# Patient Record
Sex: FEMALE | Race: BLACK OR AFRICAN AMERICAN | Employment: UNEMPLOYED
[De-identification: names, ages, dates, MRNs, and addresses within clinical notes are randomized per-mention and may not be internally consistent; named-entity substitution may affect disease eponyms.]

---
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
## 2025-05-21 ENCOUNTER — HOSPITAL ENCOUNTER (INPATIENT)
Facility: HOSPITAL | Age: 20
LOS: 6 days | Discharge: HOME | DRG: 885 | End: 2025-05-27
Attending: PSYCHIATRY & NEUROLOGY | Admitting: PSYCHIATRY & NEUROLOGY
Payer: COMMERCIAL

## 2025-05-21 DIAGNOSIS — F23 ACUTE PSYCHOSIS (HCC): Primary | ICD-10-CM

## 2025-05-21 PROBLEM — F29 PSYCHOSIS, UNSPECIFIED PSYCHOSIS TYPE (HCC): Status: ACTIVE | Noted: 2025-05-21

## 2025-05-21 LAB
ALBUMIN SERPL-MCNC: 3.7 G/DL (ref 3.4–5)
ALBUMIN/GLOB SERPL: 0.9 (ref 0.8–1.7)
ALP SERPL-CCNC: 75 U/L (ref 45–117)
ALT SERPL-CCNC: 9 U/L (ref 10–35)
AMPHET UR QL SCN: NEGATIVE
ANION GAP SERPL CALC-SCNC: 12 MMOL/L (ref 3–18)
AST SERPL-CCNC: 25 U/L (ref 10–38)
BARBITURATES UR QL SCN: NEGATIVE
BASOPHILS # BLD: 0.04 K/UL (ref 0–0.1)
BASOPHILS NFR BLD: 0.7 % (ref 0–2)
BENZODIAZ UR QL: NEGATIVE
BILIRUB SERPL-MCNC: 1.2 MG/DL (ref 0.2–1)
BUN SERPL-MCNC: 6 MG/DL (ref 6–23)
BUN/CREAT SERPL: 8 (ref 12–20)
CALCIUM SERPL-MCNC: 9.3 MG/DL (ref 8.5–10.1)
CANNABINOIDS UR QL SCN: NEGATIVE
CHLORIDE SERPL-SCNC: 100 MMOL/L (ref 98–107)
CO2 SERPL-SCNC: 24 MMOL/L (ref 21–32)
COCAINE UR QL SCN: NEGATIVE
CREAT SERPL-MCNC: 0.76 MG/DL (ref 0.6–1.3)
DIFFERENTIAL METHOD BLD: ABNORMAL
EOSINOPHIL # BLD: 0.1 K/UL (ref 0–0.4)
EOSINOPHIL NFR BLD: 1.6 % (ref 0–5)
ERYTHROCYTE [DISTWIDTH] IN BLOOD BY AUTOMATED COUNT: 12.8 % (ref 11.6–14.5)
ETHANOL SERPL-MCNC: <11 MG/DL (ref 0–0.08)
FENTANYL: NEGATIVE
GLOBULIN SER CALC-MCNC: 4.4 G/DL (ref 2–4)
GLUCOSE SERPL-MCNC: 80 MG/DL (ref 74–108)
HCG SERPL QL: NEGATIVE
HCT VFR BLD AUTO: 36.8 % (ref 35–45)
HGB BLD-MCNC: 11.7 G/DL (ref 12–16)
IMM GRANULOCYTES # BLD AUTO: 0.01 K/UL (ref 0–0.04)
IMM GRANULOCYTES NFR BLD AUTO: 0.2 % (ref 0–0.5)
LYMPHOCYTES # BLD: 1.26 K/UL (ref 0.9–3.6)
LYMPHOCYTES NFR BLD: 20.6 % (ref 21–52)
Lab: NORMAL
MCH RBC QN AUTO: 26.5 PG (ref 24–34)
MCHC RBC AUTO-ENTMCNC: 31.8 G/DL (ref 31–37)
MCV RBC AUTO: 83.3 FL (ref 78–100)
METHADONE UR QL: NEGATIVE
MONOCYTES # BLD: 0.43 K/UL (ref 0.05–1.2)
MONOCYTES NFR BLD: 7 % (ref 3–10)
NEUTS SEG # BLD: 4.27 K/UL (ref 1.8–8)
NEUTS SEG NFR BLD: 69.9 % (ref 40–73)
NRBC # BLD: 0 K/UL (ref 0–0.01)
NRBC BLD-RTO: 0 PER 100 WBC
OPIATES UR QL: NEGATIVE
OXYCODONE UR QL SCN: NEGATIVE
PLATELET # BLD AUTO: 295 K/UL (ref 135–420)
PMV BLD AUTO: 10.3 FL (ref 9.2–11.8)
POTASSIUM SERPL-SCNC: 3.4 MMOL/L (ref 3.5–5.5)
PROT SERPL-MCNC: 8.1 G/DL (ref 6.4–8.2)
RBC # BLD AUTO: 4.42 M/UL (ref 4.2–5.3)
SODIUM SERPL-SCNC: 136 MMOL/L (ref 136–145)
WBC # BLD AUTO: 6.1 K/UL (ref 4.6–13.2)

## 2025-05-21 PROCEDURE — 6370000000 HC RX 637 (ALT 250 FOR IP): Performed by: EMERGENCY MEDICINE

## 2025-05-21 PROCEDURE — 84703 CHORIONIC GONADOTROPIN ASSAY: CPT

## 2025-05-21 PROCEDURE — 99285 EMERGENCY DEPT VISIT HI MDM: CPT

## 2025-05-21 PROCEDURE — 93005 ELECTROCARDIOGRAM TRACING: CPT | Performed by: EMERGENCY MEDICINE

## 2025-05-21 PROCEDURE — 80307 DRUG TEST PRSMV CHEM ANLYZR: CPT

## 2025-05-21 PROCEDURE — 80053 COMPREHEN METABOLIC PANEL: CPT

## 2025-05-21 PROCEDURE — 82077 ASSAY SPEC XCP UR&BREATH IA: CPT

## 2025-05-21 PROCEDURE — 94761 N-INVAS EAR/PLS OXIMETRY MLT: CPT

## 2025-05-21 PROCEDURE — 90791 PSYCH DIAGNOSTIC EVALUATION: CPT | Performed by: SOCIAL WORKER

## 2025-05-21 PROCEDURE — 1240000000 HC EMOTIONAL WELLNESS R&B

## 2025-05-21 PROCEDURE — 85025 COMPLETE CBC W/AUTO DIFF WBC: CPT

## 2025-05-21 RX ORDER — BENZTROPINE MESYLATE 1 MG/1
1 TABLET ORAL EVERY 6 HOURS PRN
Status: DISCONTINUED | OUTPATIENT
Start: 2025-05-21 | End: 2025-05-27 | Stop reason: HOSPADM

## 2025-05-21 RX ORDER — HYDROXYZINE HYDROCHLORIDE 25 MG/1
25 TABLET, FILM COATED ORAL EVERY 6 HOURS PRN
Status: DISCONTINUED | OUTPATIENT
Start: 2025-05-21 | End: 2025-05-27 | Stop reason: HOSPADM

## 2025-05-21 RX ORDER — HALOPERIDOL 5 MG/ML
5 INJECTION INTRAMUSCULAR EVERY 6 HOURS PRN
Status: DISCONTINUED | OUTPATIENT
Start: 2025-05-21 | End: 2025-05-27 | Stop reason: HOSPADM

## 2025-05-21 RX ORDER — MAGNESIUM HYDROXIDE/ALUMINUM HYDROXICE/SIMETHICONE 120; 1200; 1200 MG/30ML; MG/30ML; MG/30ML
30 SUSPENSION ORAL EVERY 6 HOURS PRN
Status: DISCONTINUED | OUTPATIENT
Start: 2025-05-21 | End: 2025-05-27 | Stop reason: HOSPADM

## 2025-05-21 RX ORDER — TRAZODONE HYDROCHLORIDE 50 MG/1
50 TABLET ORAL NIGHTLY PRN
Status: DISCONTINUED | OUTPATIENT
Start: 2025-05-21 | End: 2025-05-27 | Stop reason: HOSPADM

## 2025-05-21 RX ORDER — HALOPERIDOL 5 MG/1
5 TABLET ORAL EVERY 6 HOURS PRN
Status: DISCONTINUED | OUTPATIENT
Start: 2025-05-21 | End: 2025-05-27 | Stop reason: HOSPADM

## 2025-05-21 RX ORDER — ACETAMINOPHEN 325 MG/1
650 TABLET ORAL EVERY 4 HOURS PRN
Status: DISCONTINUED | OUTPATIENT
Start: 2025-05-21 | End: 2025-05-27 | Stop reason: HOSPADM

## 2025-05-21 RX ORDER — POTASSIUM CHLORIDE 1500 MG/1
40 TABLET, EXTENDED RELEASE ORAL
Status: COMPLETED | OUTPATIENT
Start: 2025-05-21 | End: 2025-05-21

## 2025-05-21 RX ORDER — BENZTROPINE MESYLATE 1 MG/ML
1 INJECTION, SOLUTION INTRAMUSCULAR; INTRAVENOUS EVERY 6 HOURS PRN
Status: DISCONTINUED | OUTPATIENT
Start: 2025-05-21 | End: 2025-05-27 | Stop reason: HOSPADM

## 2025-05-21 RX ADMIN — POTASSIUM CHLORIDE 40 MEQ: 1500 TABLET, EXTENDED RELEASE ORAL at 09:17

## 2025-05-21 ASSESSMENT — SLEEP AND FATIGUE QUESTIONNAIRES
DO YOU HAVE DIFFICULTY SLEEPING: YES
DO YOU USE A SLEEP AID: NO
SLEEP PATTERN: DIFFICULTY FALLING ASLEEP;RESTLESSNESS;NIGHTMARES/TERRORS
AVERAGE NUMBER OF SLEEP HOURS: 3

## 2025-05-21 ASSESSMENT — PAIN SCALES - GENERAL
PAINLEVEL_OUTOF10: 0
PAINLEVEL_OUTOF10: 0

## 2025-05-21 NOTE — ED NOTES
Patient standing at door saying she has to leave.  She states that she has to pick her kid up from school.  Pt informed that her kid is safe at home with his grandma.  Pt provided food and drink.

## 2025-05-21 NOTE — ED NOTES
Pt urinated on bed.  Provided new scrubs and linen.  Pt ambulates with no difficulty, and stated she just didn't want to get up to the bathroom to urinate.    General

## 2025-05-21 NOTE — ED NOTES
TRANSFER - OUT REPORT:    Verbal report given to MONIQUE Dixon on Agnieszka Mcgill  being transferred to [facility] for routine progression of patient care       Report consisted of patient's Situation, Background, Assessment and   Recommendations(SBAR).     Information from the following report(s) Nurse Handoff Report, Index, ED Encounter Summary, ED SBAR, Adult Overview, Intake/Output, MAR, Recent Results, Quality Measures, Neuro Assessment, and Event Log was reviewed with the receiving nurse.    [place] Fall Assessment:    Presents to emergency department  because of falls (Syncope, seizure, or loss of consciousness): No  Age > 70: No  Altered Mental Status, Intoxication with alcohol or substance confusion (Disorientation, impaired judgment, poor safety awaremess, or inability to follow instructions): No  Impaired Mobility: Ambulates or transfers with assistive devices or assistance; Unable to ambulate or transer.: No  Nursing Judgement: No          Lines:       Opportunity for questions and clarification was provided.      Patient transported with:  Tech

## 2025-05-21 NOTE — VIRTUAL HEALTH
Agnieszka Mcgill  [id number]  2005     Social Work Behavioral Health Crisis Assessment    05/21/25    Chief Complaint: Psych Evaluation    HPI: Patient is a 20 y.o. Black /  female who presents for psych evaluation. Patient presented to the ED on 05/21/25 from home.    ED recorded, Patient and mother are more in altercation this morning. Each of them called police. Was advised to come to the emergency department for mental health assessment and perhaps initiation of medications.     Pt reported, \"I was at my house, and I had been seeing things for a week. I saw my mom this morning and we didn't see eye to eye. I have been seeing cartoon, black figures, and stuff on the wall. I do see them now, to the right behind me, and the corners. They don't speak. Its been about a week or month. I am on a year cleanse - drinking every night and smoking, but its been a year. In the last 24 hours, I was thinking about killing people, for no reason. I would do it but why, why would I.\"    Collateral:      Lucy Mcgill (Parent)  [phone] (Mobile)   No answer, straight to voicemail    Past Psychiatric History:  Previous Diagnoses/symptoms: Denies  Previous suicide attempts/self-harm: \"I was self-harming, using an knife, and just hurting myself. I would do it on my arms but its been a while, like 9 or 10\".  Inpatient psychiatric hospitalizations: no  Current outpatient psychiatric provider: [facility] - about three months   Current therapist: States not in therapy  Previous psychiatric medication trials: No prior medication trials  Current psychiatric medications: No current psychiatric medications  Family Psychiatric History: Denies    Sleep Hours: 4-6    Sleep concerns: difficulty attaining sleep    Use of sleep medications: denies    Substance Abuse History:  Tobacco: Denies  Alcohol: Denies  Marijuana: Denies  Stimulant: Denies  Opiates: Denies  Benzodiazepine: Denies  Other illicit drug

## 2025-05-21 NOTE — ED NOTES
Patients father, Marley Mcgill, called and given update on progression of care.  He requests to be called when it is known if pt will be discharged or admitted. Phone number is [phone].

## 2025-05-21 NOTE — ED NOTES
Patients mother arrived to ED for update on daughter.  RN talked with her in consultation room.  Patients mother showed this RN videos of patient in psychotic episodes, where she is \"not herself.\"  Her mother expresses concern for the safety of her grandson (patients son) who is 3 years old.  One video while patient was \"not being herself\"  patients son was dragged down the stairs, and he was crying asking not to be in the care of his mother.  [facility] notified of this new information given.

## 2025-05-21 NOTE — ED NOTES
Transfer Center Handoff for Behavioral Health Transfers      Patient's Current Location: [facility] EMERGENCY DEPARTMENT     Chief Complaint   Patient presents with    Mental Health Problem       Current or History of Violent Behavior: No    Currently in Restraints Now or During this Encounter: No  (Specify if Agitation or self harm is noted in ED?)  If yes, please describe behaviors requiring restraint:             Medical Clearance Documented and Verified in the Chart: Yes    Allergies   Allergen Reactions    Penicillins Rash        Can Patient Tolerate Lying Flat: Yes    Able to Perform ADLs:  Yes  (Specify if able to ambulate or uses any mobility devices such as cane or walker)  Activity:    Level of Assistance:    Assistive Device:    Miscellaneous Devices:      LABS    CBC:   Lab Results   Component Value Date/Time    WBC 6.1 05/21/2025 08:27 AM    RBC 4.42 05/21/2025 08:27 AM    HGB 11.7 05/21/2025 08:27 AM    HCT 36.8 05/21/2025 08:27 AM    MCV 83.3 05/21/2025 08:27 AM    MCH 26.5 05/21/2025 08:27 AM    MCHC 31.8 05/21/2025 08:27 AM    RDW 12.8 05/21/2025 08:27 AM     05/21/2025 08:27 AM    MPV 10.3 05/21/2025 08:27 AM     CMP:   Lab Results   Component Value Date/Time     05/21/2025 08:27 AM    K 3.4 05/21/2025 08:27 AM     05/21/2025 08:27 AM    CO2 24 05/21/2025 08:27 AM    BUN 6 05/21/2025 08:27 AM    CREATININE 0.76 05/21/2025 08:27 AM    LABGLOM >90 05/21/2025 08:27 AM    LABGLOM >60 08/19/2023 08:05 PM    GLUCOSE 80 05/21/2025 08:27 AM    CALCIUM 9.3 05/21/2025 08:27 AM    BILITOT 1.2 05/21/2025 08:27 AM    ALKPHOS 75 05/21/2025 08:27 AM    ALKPHOS 128 11/22/2021 08:20 AM    AST 25 05/21/2025 08:27 AM    ALT 9 05/21/2025 08:27 AM     Drug Panel:   Lab Results   Component Value Date/Time    AMPHETAMUR Negative 05/21/2025 08:19 AM    BARBITURATUR Negative 05/21/2025 08:19 AM    COCAINEUR Negative 05/21/2025 08:19 AM    METHADU Negative 05/21/2025 08:19 AM    OPIAU

## 2025-05-21 NOTE — CONSULTS
[facility]  FAMILY MEDICINE CONSULT NOTE FOR BEHAVIORAL HEALTH UNIT    Patient:    Agnieszka Mcgill , 20 y.o. female   Room/Bed:  105/01  Admission Date:   5/21/2025  Code status:  Full Code      Reason for Consult: medical management   Psychiatry Attending Requesting Consult: Dr. Kang     ASSESSMENT:  Agnieszka Mcgill is a 20 y.o. female w/ a PMH of anemia,  presenting for auditory hallucinations who is now admitted to the [facility] Behavioral Health Unit.    Nurse Chaperone during History and Physical: no    PLAN:    Problem 1: Auditory hallucinations   -Management per inpatient psychiatry    Anemia   - previously been on iron supplement but states she stopped taking   - has had mild anemia in past   - asymptomatic   - does get menstrual periods, unsure when last was (urine preg negative this admission)   - encouraged patient to follow up outpatient when feeling better. Patient agreed, however focused on leaving hospital at this time and continues asking about it during interview      Sore throat   - states she came to hospital due to sore throat, 1month, denies fevers or other symptoms. Exam of throat unremarkable, no lymphadenopathy   - discussed reassuring exam with patient and discussed tylenol and lozenges available as needed       Global  Diet: per unit protocol  Mobility: per unit protocol     Thank you for this consult.         SUBJECTIVE:   Dr. Kang has consulted Family Medicine to evaluate Agnieszka Mcgill  in the Emergency Department. She  is a 20 y.o. female w/ PMHx of anemia ,  presenting for auditory hallucinations who is now admitted to the [facility] Behavioral Health Unit.     ED Course:  -Vitals: VSS  -Labs: Potassium mildly low at 3.4 (replete in ED), Urine pregnancy negative, negative ethanol level, negative UDS, CBC showing mild normocytic anemia to 11.7 (patient has had this in past, baseline Hb 11-12)    -Imaging: --  -EKG: normal sinus rhythm, T wave inversion in V1, otherwise no

## 2025-05-21 NOTE — PLAN OF CARE
Problem: Risk for Elopement  Goal: Patient will not exit the unit/facility without proper excort  Outcome: Not Progressing     Problem: Risk for Elopement  Goal: Patient will not exit the unit/facility without proper excort  Outcome: Not Progressing

## 2025-05-21 NOTE — BSMART NOTE
Crisis note:    Called the [facility] answering service to have the on call clinician paged to request a TDO evaluation on this patient.    1140: Melodie of [facility] responded to page and has been informed of the TDO evaluation request on this patient.

## 2025-05-21 NOTE — ED TRIAGE NOTES
Pt in ED c/o needing to speak to a mental health person. Pt states she got into an argument with her mom this AM. And her mom called the police. Pt was brought into ER by police. Pt has hx of Bipolar, anxiety . Pt denies SI/HI but does express some AH and VH

## 2025-05-21 NOTE — FLOWSHEET NOTE
05/21/25 0806   Vital Signs   Temp 99.1 °F (37.3 °C)   Pulse 96   Respirations 18   /72   MAP (Calculated) 86   Oxygen Therapy   SpO2 97 %   Height and Weight   Height 1.499 m (4' 11\")   Weight - Scale 74.8 kg (165 lb)   BSA (Calculated - sq m) 1.76 sq meters   BMI (Calculated) 33.4

## 2025-05-21 NOTE — ED PROVIDER NOTES
homophones, and other interpretive errors are inadvertently transcribed by the computer software.  Please disregard these errors.  Please excuse any errors that have escaped final proofreading.      CORETTA Becerril Jana L, PA  05/21/25 3164

## 2025-05-21 NOTE — ED NOTES
Pt seen by Mary from [facility] who reports she is supporting TDO at this time.

## 2025-05-21 NOTE — BSMART NOTE
Crisis note:    Melodie of [facility] has seen patient and is supporting a TDO, will be presenting case to the .      1350: discussed patient with Dr. Tinsley and patient has been accepted on the TDO.  Orders received  Bed assignment pending.  Adult 2  Room assignment pending discharge  Report to ext. [phone]  Unit MONIQUE Dixon informed  Orders received.  Room will be 105-01      Dr. Tinsley spoke to [facility] in regards to patient being in outpatient services with them. Informed that patient was a no show for her follow up appointment in March.

## 2025-05-22 PROBLEM — F22 DELUSIONAL DISORDER (HCC): Status: ACTIVE | Noted: 2025-05-21

## 2025-05-22 LAB
EKG ATRIAL RATE: 80 BPM
EKG DIAGNOSIS: NORMAL
EKG P AXIS: 63 DEGREES
EKG P-R INTERVAL: 158 MS
EKG Q-T INTERVAL: 386 MS
EKG QRS DURATION: 82 MS
EKG QTC CALCULATION (BAZETT): 445 MS
EKG R AXIS: 84 DEGREES
EKG T AXIS: 42 DEGREES
EKG VENTRICULAR RATE: 80 BPM

## 2025-05-22 PROCEDURE — 93010 ELECTROCARDIOGRAM REPORT: CPT | Performed by: INTERNAL MEDICINE

## 2025-05-22 PROCEDURE — 1240000000 HC EMOTIONAL WELLNESS R&B

## 2025-05-22 PROCEDURE — 6370000000 HC RX 637 (ALT 250 FOR IP): Performed by: PSYCHIATRY & NEUROLOGY

## 2025-05-22 RX ADMIN — TRAZODONE HYDROCHLORIDE 50 MG: 50 TABLET ORAL at 23:10

## 2025-05-22 RX ADMIN — HYDROXYZINE HYDROCHLORIDE 25 MG: 25 TABLET, FILM COATED ORAL at 23:10

## 2025-05-22 ASSESSMENT — PAIN SCALES - GENERAL: PAINLEVEL_OUTOF10: 0

## 2025-05-22 NOTE — PLAN OF CARE
Problem: Psychosis  Goal: Will report no hallucinations or delusions  Description: INTERVENTIONS:1. Administer medication as  ordered2. Assist with reality testing to support increasing orientation3. Assess if patient's hallucinations or delusions are encouraging self harm or harm to others and intervene as appropriate  Outcome: Not Progressing     Problem: Anxiety  Goal: Will report anxiety at manageable levels  Description: INTERVENTIONS:1. Administer medication as ordered2. Teach and rehearse alternative coping skills3. Provide emotional support with 1:1 interaction with staff  Outcome: Not Progressing

## 2025-05-22 NOTE — BH NOTE
Pt sitting in day room quiet. Pt refused breakfast this morning. Will continue to monitor for safety.

## 2025-05-22 NOTE — H&P
Behavioral Health AdmissionNote    Admit Date: 5/21/2025  Hospital day 1    Vitals : No data found.  Labs:  No results found for this or any previous visit (from the past 24 hours).  Meds:   Current Facility-Administered Medications   Medication Dose Route Frequency    benztropine (COGENTIN) tablet 1 mg  1 mg Oral Q6H PRN    acetaminophen (TYLENOL) tablet 650 mg  650 mg Oral Q4H PRN    aluminum & magnesium hydroxide-simethicone (MAALOX PLUS) 200-200-20 MG/5ML suspension 30 mL  30 mL Oral Q6H PRN    hydrOXYzine HCl (ATARAX) tablet 25 mg  25 mg Oral Q6H PRN    haloperidol (HALDOL) tablet 5 mg  5 mg Oral Q6H PRN    Or    haloperidol lactate (HALDOL) injection 5 mg  5 mg IntraMUSCular Q6H PRN    benztropine mesylate (COGENTIN) injection 1 mg  1 mg IntraMUSCular Q6H PRN    traZODone (DESYREL) tablet 50 mg  50 mg Oral Nightly PRN    benzocaine-menthol (CEPACOL SORE THROAT) lozenge 1 lozenge  1 lozenge Oral Q2H PRN      Hospital Problems: Principal Problem:    Delusional disorder (HCC)  Resolved Problems:    * No resolved hospital problems. *      Subjective:   Mental Status Exam  Sensorium: alert  Orientation: oriented to time, place, person and situation  Relations: guarded  Eye Contact: poor  Appearance: is tense  Thought Process: normal rate of thoughts, poor abstract reasoning/computation, and illogical    Thought Content: obsessions  and centered on beliefher mother has been replaced by a different woman and does not have to deal with her   Suicidal: denies   Homicidal: denies   Mood: is irritable and unhappy   Affect: stable  Memory: shows no evidence of impairment     Concentration: distractable  Abstraction: concrete  Insight: The patient shows no insight    OR Poor  Judgement: is psychologically impaired OR  Poor    Assessment/Plan:   not changed    Identifying data: The patient is a 20-year-old single -American female who says that she lives in a house with mother and her 3-year-old son and is not

## 2025-05-22 NOTE — PROGRESS NOTES
conducted an initial consultation and Spiritual Assessment for Agnieszka Mcgill, who is a 20 y.o.,female. Patient's Primary Language is: English.   According to the patient's EMR Cheondoism Affiliation is: Quaker.     The reason the Patient came to the hospital is:   Patient Active Problem List    Diagnosis Date Noted    Psychosis, unspecified psychosis type (HCC) 05/21/2025    Encounter for induction of labor 11/23/2021    Pregnancy 11/22/2021        The  provided the following Interventions:  Initiated a relationship of care and support.   Explored issues of milton, belief, spirituality and Taoist/ritual needs while hospitalized.  Listened empathically.  Provided chaplaincy education.  Provided information about Spiritual Care Services.  Offered prayer and assurance of continued prayers on patient's behalf.   Chart reviewed.    The following outcomes where achieved:  Patient shared limited information about both their medical narrative and spiritual journey/beliefs.   confirmed Patient's Cheondoism Affiliation.  Patient processed feeling about current hospitalization.  Patient expressed gratitude for 's visit.    Assessment:  Patient does not have any Taoist/cultural needs that will affect patient's preferences in health care.  There are no spiritual or Taoist issues which require intervention at this time.     Plan:  Chaplains will continue to follow and will provide pastoral care on an as needed/requested basis.   recommends bedside caregivers page  on duty if patient shows signs of acute spiritual or emotional distress.    wan Salguero  Spiritual Care   [phone]     Spiritual Health History and Assessment/Progress Note  [facility]    Loneliness/Social Isolation,  ,  , Initial Encounter    Name: Agnieszka Mcgill MRN: [id number]    Age: 20 y.o.     Sex: female   Language: English   Denominational: Quaker   Psychosis, unspecified

## 2025-05-22 NOTE — GROUP NOTE
Group Therapy Note    Date: 5/22/2025    Group Start Time: 1410  Group End Time: 1430  Group Topic: Recreational    [facility]    Cj Srinivasan        Group Therapy Note    Attendees: 3/8      Group: Self-Esteem Thumb ball     Focus: Patients used thumball to learn what healthy self-esteem is and how to increase. Group members were able to answer questions, listen to other participants, and learn how to increase self-esteem and self-worth. This group may help enhance self-awareness, self-confidence, mood, and decrease stress and anxiety.               Notes: Patient shared feeling \"good\" during check-in. Patient resistant to sharing in group discussions and expressed she didn't want to participate in group game.     Status After Intervention:  Unchanged    Participation Level: Minimal and None    Participation Quality: Resistant      Speech:  hesitant      Thought Process/Content: Logical      Affective Functioning: Congruent      Mood: Calm      Level of consciousness:  Inattentive      Response to Learning: Resistant      Endings: None Reported    Modes of Intervention: Socialization and Activity        Certified Therapeutic Recreation Specialist    Cj Srinivasan

## 2025-05-22 NOTE — GROUP NOTE
Group Therapy Note    Date: 5/22/2025    Group Start Time: 0930  Group End Time: 1015  Group Topic: Recreational    [facility]    Cj Srinivasan        Group Therapy Note    Attendees: 5/15    Group type: Exercise    Group Focus: This recreational group engaged patients in physical activity.  Recreational therapists led group members in guided exercises. Patients discussed different physical activities and relaxation techniques they may use or can add to their daily routines. This group may help reduce feelings of depression and stress and enhance mood and over emotional well-being.        Notes: Patient did not attend group.   Certified Therapeutic Recreation Specialist    Cj Srinivasan

## 2025-05-22 NOTE — PROGRESS NOTES
Patient has denied having suicidal ideations.She has been observed walking around in the community room.She has verbalized that she wants to go home but has not been demanding.She has approached the nurse's station several times asking when the docter will arrive.She has stated\"I slept good.. .I am not talking about problems right now.I spoke to somebody is why I am here . I had to urinate.\"She has not eaten meals.She has verbalized feeling anxious.She has been encouraged to share feelings and learn relaxation techniques.She did remove her necklace and hair clip this morning and items were given to security.She did attempt to remove her nose ring but was unable to do so as she tried in front of this writer and security.She was able to verbally  contract for safety and reasons for precautions and safety protocol explained to patient.

## 2025-05-22 NOTE — BH NOTE
Group Therapy Note  Agnieszka Mcgill     Date: 5/22/2025     Group Start Time: 10:00 AM  Group End Time:11:00 am     Group Topic: Coping and Adjusting to Transition          Attendees: 6     Group:  Storytelling Treatment Group  Focus:   Patient listened to a children's story about a grasshopper that transitions from one home to another home, meets new friends and then has to leave to go to another home. Participants processed emotions associated with living in one place and having to transition to another home. Participants discussed emotions about leaving friends, making new friends and then having to leave those friends to leave again. Participants described the importance of having support systems wherever you go in life. The participants described the kind of individuals that they want to be apart of their life journey's. The facilitator emphasized the importance of having positive support systems. The story does not have a definitive ending. The participants were encouraged to dream big and encouraged to think positively about their futures.             Note: Patient did not participate in the group.       Chel Ceja LCSW

## 2025-05-22 NOTE — BH NOTE
The patient was being observed in the dayroom for the majority of the shift. She kept changing chairs and attempting to move them around. The patient engaged in arts and crafts and was present for snack time at 2000, during which she ate snacks. While watching TV, the patient remained cooperative and interacted appropriately with peers. The patient sat quietly in the dayroom for a period and then alternated between her room and the dayroom, eventually staying in the dayroom for the remainder of the shift. During this time, the patient was observed laughing to herself on multiple occasions. She appeared to be anxious and later on became agitated. The patient was observed to have approximately 0 hours of sleep.

## 2025-05-22 NOTE — PLAN OF CARE
Problem: Risk for Elopement  Goal: Patient will not exit the unit/facility without proper excort  5/21/2025 1817 by Destiny Orlando, RN  Outcome: Not Progressing     Problem: Behavior  Goal: Pt/Family maintain appropriate behavior and adhere to behavioral management agreement, if implemented  Description: INTERVENTIONS:1. Assess patient/family's coping skills and  non-compliant behavior (including use of illegal substances)2. Notify security of behavior or suspected illegal substances which indicate the need for search of the family and/or belongings3. Encourage verbalization of thoughts and concerns in a socially appropriate manner4. Utilize positive, consistent limit setting strategies supporting safety of patient, staff and others5. Encourage participation in the decision making process about the behavioral management agreement6. If a visitor's behavior poses a threat to safety call refer to organization policy.7. Initiate consult with , Psychosocial CNS, Spiritual Care as appropriate  Flowsheets (Taken 5/21/2025 2216)  Patient/family maintains appropriate behavior and adheres to behavioral management agreement, if implemented:   Assess patient/family’s coping skills and  non-compliant behavior (including use of illegal substances)   Encourage verbalization of thoughts and concerns in a socially appropriate manner   Utilize positive, consistent limit setting strategies supporting safety of patient, staff and others     Problem: Risk for Elopement  Goal: Patient will not exit the unit/facility without proper excort  5/21/2025 1817 by Destiny Orlando, RN  Outcome: Not Progressing   Pt. is visible in the milieu. She is anxious and expressed desire to go home. Pt. is loose and paranoid. She refused to go to her room .She stated that she will stay in the dayroom for the night. She is pleasant and cooperative. She is free from falls, self harm or harming others.Will continue to monitor for safety, high risk

## 2025-05-22 NOTE — PROGRESS NOTES
Pt. remain awake in the dayroom. She still refused to got to her room.She is getting irritable and argumentative. Pt. needs redirection and reorientation. Pt. refused to take  PRN medication  for anxiety or agitation. Will continue to monitor for safety and provide  Support as needed.

## 2025-05-22 NOTE — PROGRESS NOTES
Patient refused to complete the OQ Assessment. Patient refused to share any information about where she resides, who she is seen by in the community or any information to assist with discharge planning.

## 2025-05-23 PROCEDURE — 1240000000 HC EMOTIONAL WELLNESS R&B

## 2025-05-23 PROCEDURE — 6370000000 HC RX 637 (ALT 250 FOR IP): Performed by: PSYCHIATRY & NEUROLOGY

## 2025-05-23 RX ORDER — HALOPERIDOL 5 MG/1
5 TABLET ORAL 2 TIMES DAILY
Status: DISCONTINUED | OUTPATIENT
Start: 2025-05-23 | End: 2025-05-25

## 2025-05-23 RX ADMIN — TRAZODONE HYDROCHLORIDE 50 MG: 50 TABLET ORAL at 20:58

## 2025-05-23 RX ADMIN — HALOPERIDOL 5 MG: 5 TABLET ORAL at 20:58

## 2025-05-23 ASSESSMENT — PAIN SCALES - GENERAL: PAINLEVEL_OUTOF10: 0

## 2025-05-23 NOTE — PROGRESS NOTES
Behavioral Health Progress Note    Admit Date: 5/21/2025  Hospital day 2    Vitals : No data found.  Labs:  No results found for this or any previous visit (from the past 24 hours).  Meds:   Current Facility-Administered Medications   Medication Dose Route Frequency    haloperidol (HALDOL) tablet 5 mg  5 mg Oral BID    benztropine (COGENTIN) tablet 1 mg  1 mg Oral Q6H PRN    acetaminophen (TYLENOL) tablet 650 mg  650 mg Oral Q4H PRN    aluminum & magnesium hydroxide-simethicone (MAALOX PLUS) 200-200-20 MG/5ML suspension 30 mL  30 mL Oral Q6H PRN    hydrOXYzine HCl (ATARAX) tablet 25 mg  25 mg Oral Q6H PRN    haloperidol (HALDOL) tablet 5 mg  5 mg Oral Q6H PRN    Or    haloperidol lactate (HALDOL) injection 5 mg  5 mg IntraMUSCular Q6H PRN    benztropine mesylate (COGENTIN) injection 1 mg  1 mg IntraMUSCular Q6H PRN    traZODone (DESYREL) tablet 50 mg  50 mg Oral Nightly PRN    benzocaine-menthol (CEPACOL SORE THROAT) lozenge 1 lozenge  1 lozenge Oral Q2H PRN      Hospital Problems: Principal Problem:    Delusional disorder (HCC)  Resolved Problems:    * No resolved hospital problems. *      Subjective:   Mental Status Exam  Sensorium: alert  Orientation: only aware of place and person  Relations: guarded, uncooperative, and unreliable  Eye Contact: poor  Appearance: is unkempt, is bizarre, and is tense  Thought Process: fast rate of thoughts, poor abstract reasoning/computation, and illogical    Thought Content: delusions   Suicidal: denies   Homicidal: denies   Mood: is angry and is anxious   Affect: labile  Memory: is impaired, is recent, and is remote     Concentration: distractable  Abstraction: concrete  Insight: The patient shows no insight    OR Poor  Judgement: is psychologically impaired OR  Poor    Assessment/Plan:   not changed       Identifying data: The patient is a 20-year-old single -American female who says that she lives in a house with mother and her 3-year-old son and is not insured.

## 2025-05-23 NOTE — GROUP NOTE
Group Therapy Note    Date: 5/23/2025    Group Start Time: 1440  Group End Time: 1515  Group Topic: Recreational    [facility]    Cj Srinivasan        Group Therapy Note    Attendees: 5/8    Group Type: Coping Skills Elizabet   Group Focus: Recreational therapy group engaged patients through a game that focused on coping skills. PTs discussed the importance of coping mechanisms and what coping strategies they use. This group may help enhance your social skills, coping techniques, and decrease stress, depression, and anxiety.            Notes: Patient minimally engaged in group discussion. Patient shared she enjoys music as a coping skill. Patient at one point stop responding during discussions, ignoring therapist questions.      Status After Intervention:  Unchanged    Participation Level: Minimal    Participation Quality: Sharing      Speech:  hesitant      Thought Process/Content: Logical      Affective Functioning: Flat      Mood: Calm      Level of consciousness:  Alert      Response to Learning: Able to verbalize current knowledge/experience      Endings: None Reported    Modes of Intervention: Education, Socialization, and Activity        Certified Therapeutic Recreation Specialist    Cj Srinivasan

## 2025-05-23 NOTE — BH NOTE
Note: The above pt on 5-23-25 was In Voluntary Committed cardex updated and charge nurse informed. She did not experience any falls while entering and exiting off the unit with staff along with hospital security.     Armand MATTHEW  5-23-25

## 2025-05-23 NOTE — PROGRESS NOTES
Patient seen squatting in dayroom. Behavioral [facility] called asked this nurse to come to dayroom. Once I arrived to patient, she stood up and had a bowel movement on the floor. Patient cleaned up the stool and stated she had to go to the bathroom. This nurse escorted patient to her room so that she could finish using the bathroom. Patient repeated several times \"I just want to go home\". Advised her the dayroom is not the proper place to use the bathroom. Patient was apologetic and verbalized understanding.

## 2025-05-23 NOTE — BH NOTE
The patient was being  observed in the dayroom engaging in arts and crafts and watching television. She was present during snack time and interacted appropriately with peers. Throughout the evening, the patient moved intermittently between her room and the dayroom, spending extended periods in the dayroom. She demonstrated a fixation with rearranging chairs, frequently moving back and forth between different seats and repositioning them. At approximately 2145, the patient expressed a desire to go home and subsequently engaged in disorganized behavior. She sat down in the dayroom, removed her pants, and had a bowel movement. She then used a pair of blue pants to clean the area and returned to her room to change clothes. At around 0000, the patient was observed repeatedly flushing the toilet multiple times in succession. During this time, the patient displayed behavioral dysregulation and was not cooperative. However, she was intermittently quiet and withdrawn, occasionally interacting with peers,typically only when approached first. The patient was being observed sleeping for approximately 5 hours.

## 2025-05-23 NOTE — GROUP NOTE
Group Therapy Note    Date: 5/23/2025    Group Start Time: 0940  Group End Time: 1045  Group Topic: Recreational    [facility]    Cj Srinivasan        Group Therapy Note    Attendees: 6/18      Group Type: Anxiety Thumball    Group Focus: Patients used thumball to learn coping skills for anxiety, while increasing social skills. Group members were able to answer questions, listen and communicate with peers on handling anxious feelings. This group may help enhance social skills, coping skills, mood, and decrease stress and anxiety.          Notes: Patient did not participating in group, patient sitting in group area completing a coloring sheet.    Certified Therapeutic Recreation Specialist    Cj Srinivasan

## 2025-05-23 NOTE — PROGRESS NOTES
The patient is insisting that the doctor not only told her, but wrote in his note today that she can be discharged  today.  She is asking to be discharged.  The patient was made aware that the doctor did not discharge her today.

## 2025-05-23 NOTE — PLAN OF CARE
Problem: Psychosis  Goal: Will report no hallucinations or delusions  Description: INTERVENTIONS:1. Administer medication as  ordered2. Assist with reality testing to support increasing orientation3. Assess if patient's hallucinations or delusions are encouraging self harm or harm to others and intervene as appropriate  5/22/2025 1212 by Garima Fulton, RN  Outcome: Not Progressing     Problem: Behavior  Goal: Pt/Family maintain appropriate behavior and adhere to behavioral management agreement, if implemented  Description: INTERVENTIONS:1. Assess patient/family's coping skills and  non-compliant behavior (including use of illegal substances)2. Notify security of behavior or suspected illegal substances which indicate the need for search of the family and/or belongings3. Encourage verbalization of thoughts and concerns in a socially appropriate manner4. Utilize positive, consistent limit setting strategies supporting safety of patient, staff and others5. Encourage participation in the decision making process about the behavioral management agreement6. If a visitor's behavior poses a threat to safety call refer to organization policy.7. Initiate consult with , Psychosocial CNS, Spiritual Care as appropriate  Outcome: Not Progressing     Problem: Anxiety  Goal: Will report anxiety at manageable levels  Description: INTERVENTIONS:1. Administer medication as ordered2. Teach and rehearse alternative coping skills3. Provide emotional support with 1:1 interaction with staff  5/22/2025 7823 by Mary Rea, RN  Outcome: Not Progressing  5/22/2025 1212 by Garima Fulton, RN  Outcome: Not Progressing      Problem: Self Harm/Suicidality  Goal: Will have no self-injury during hospital stay  Description: INTERVENTIONS:1.  Ensure constant observer at bedside with Q15M safety checks2.  Maintain a safe environment3.  Secure patient belongings4.  Ensure family/visitors adhere to safety

## 2025-05-23 NOTE — PLAN OF CARE
Problem: Psychosis  Goal: Will report no hallucinations or delusions  Description: INTERVENTIONS:1. Administer medication as  ordered2. Assist with reality testing to support increasing orientation3. Assess if patient's hallucinations or delusions are encouraging self harm or harm to others and intervene as appropriate  Outcome: Not Progressing     Problem: Behavior  Goal: Pt/Family maintain appropriate behavior and adhere to behavioral management agreement, if implemented  Description: INTERVENTIONS:1. Assess patient/family's coping skills and  non-compliant behavior (including use of illegal substances)2. Notify security of behavior or suspected illegal substances which indicate the need for search of the family and/or belongings3. Encourage verbalization of thoughts and concerns in a socially appropriate manner4. Utilize positive, consistent limit setting strategies supporting safety of patient, staff and others5. Encourage participation in the decision making process about the behavioral management agreement6. If a visitor's behavior poses a threat to safety call refer to organization policy.7. Initiate consult with , Psychosocial CNS, Spiritual Care as appropriate  5/23/2025 1130 by Angela Hutson, RN  Outcome: Progressing    Problem: Self Harm/Suicidality  Goal: Will have no self-injury during hospital stay  Description: INTERVENTIONS:1.  Ensure constant observer at bedside with Q15M safety checks2.  Maintain a safe environment3.  Secure patient belongings4.  Ensure family/visitors adhere to safety recommendations5.  Ensure safety tray has been added to patient's diet order6.  Every shift and PRN: Re-assess suicidal risk via Frequent Screener  5/23/2025 1130 by Angela Hutson, RN  Outcome: Progressing      Patient presents suspicious and labile; however, she is cooperative upon approach. Patient is hyper-focused on discharge. No inappropriate or bizarre behaviors noted thus far in this

## 2025-05-24 PROCEDURE — 1240000000 HC EMOTIONAL WELLNESS R&B

## 2025-05-24 PROCEDURE — 6370000000 HC RX 637 (ALT 250 FOR IP): Performed by: PSYCHIATRY & NEUROLOGY

## 2025-05-24 RX ADMIN — HALOPERIDOL 5 MG: 5 TABLET ORAL at 11:07

## 2025-05-24 RX ADMIN — HALOPERIDOL 5 MG: 5 TABLET ORAL at 20:25

## 2025-05-24 NOTE — PLAN OF CARE
Problem: Self Harm/Suicidality  Goal: Will have no self-injury during hospital stay  Description: INTERVENTIONS:1.  Ensure constant observer at bedside with Q15M safety checks2.  Maintain a safe environment3.  Secure patient belongings4.  Ensure family/visitors adhere to safety recommendations5.  Ensure safety tray has been added to patient's diet order6.  Every shift and PRN: Re-assess suicidal risk via Frequent Screener  Outcome: Progressing     Problem: Behavior  Goal: Pt/Family maintain appropriate behavior and adhere to behavioral management agreement, if implemented  Description: INTERVENTIONS:1. Assess patient/family's coping skills and  non-compliant behavior (including use of illegal substances)2. Notify security of behavior or suspected illegal substances which indicate the need for search of the family and/or belongings3. Encourage verbalization of thoughts and concerns in a socially appropriate manner4. Utilize positive, consistent limit setting strategies supporting safety of patient, staff and others5. Encourage participation in the decision making process about the behavioral management agreement6. If a visitor's behavior poses a threat to safety call refer to organization policy.7. Initiate consult with , Psychosocial CNS, Spiritual Care as appropriate  Outcome: Progressing     Problem: Anxiety  Goal: Will report anxiety at manageable levels  Description: INTERVENTIONS:1. Administer medication as ordered2. Teach and rehearse alternative coping skills3. Provide emotional support with 1:1 interaction with staff  Outcome: Progressing   Pt presents with a dull affect and calm mood. Pt has been withdrawn to self on the unit. She displays appropriate boundaries on the unit, adhering to unit guidelines. Pt reports adequate sleep and appetite. She is medication compliant. Pt appears disheveled; however, she is showering daily. Pt denies SI/HI/AVH.

## 2025-05-24 NOTE — PROGRESS NOTES
Behavioral Health Progress Note    Admit Date: 5/21/2025  Hospital day     Vitals : Patient Vitals for the past 8 hrs:   BP Temp Temp src Pulse Resp SpO2   05/24/25 1224 99/66 97.6 °F (36.4 °C) Oral 72 16 100 %     Labs:  No results found for this or any previous visit (from the past 24 hours).  Meds:   Current Facility-Administered Medications   Medication Dose Route Frequency    haloperidol (HALDOL) tablet 5 mg  5 mg Oral BID    benztropine (COGENTIN) tablet 1 mg  1 mg Oral Q6H PRN    acetaminophen (TYLENOL) tablet 650 mg  650 mg Oral Q4H PRN    aluminum & magnesium hydroxide-simethicone (MAALOX PLUS) 200-200-20 MG/5ML suspension 30 mL  30 mL Oral Q6H PRN    hydrOXYzine HCl (ATARAX) tablet 25 mg  25 mg Oral Q6H PRN    haloperidol (HALDOL) tablet 5 mg  5 mg Oral Q6H PRN    Or    haloperidol lactate (HALDOL) injection 5 mg  5 mg IntraMUSCular Q6H PRN    benztropine mesylate (COGENTIN) injection 1 mg  1 mg IntraMUSCular Q6H PRN    traZODone (DESYREL) tablet 50 mg  50 mg Oral Nightly PRN    benzocaine-menthol (CEPACOL SORE THROAT) lozenge 1 lozenge  1 lozenge Oral Q2H PRN      Hospital Problems: Principal Problem:    Delusional disorder (HCC)  Resolved Problems:    * No resolved hospital problems. *      Subjective:   Medication side effects: none      Mental Status Exam  Sensorium: alert  Orientation: only aware of time, place, and person  Relations: guarded, passive, and uncooperative  Eye Contact: poor  Appearance: is unkempt  Thought Process: slow rate of thoughts, poor abstract reasoning/computation, and simple logical    Thought Content: denial of illness, oppositional but now taking meds   Suicidal: denies   Homicidal: denies   Mood: is irritable and unhappy   Affect:  strange, constricted  Memory: is impaired and is recent     Concentration: distractable  Abstraction: concrete  Insight: The patient shows little insight    OR Poor  Judgement: is psychologically impaired OR  Poor    Assessment/Plan:   Mild

## 2025-05-24 NOTE — PLAN OF CARE
Problem: Self Harm/Suicidality  Goal: Will have no self-injury during hospital stay  Description: INTERVENTIONS:1.  Ensure constant observer at bedside with Q15M safety checks2.  Maintain a safe environment3.  Secure patient belongings4.  Ensure family/visitors adhere to safety recommendations5.  Ensure safety tray has been added to patient's diet order6.  Every shift and PRN: Re-assess suicidal risk via Frequent Screener  5/23/2025 2124 by Perla Flores RN  Flowsheets (Taken 5/23/2025 2124)  Will have no self-injury during hospital stay:   Ensure constant observer at bedside with Q15M safety checks   Ensure family/visitors adhere to safety recommendations   Every shift and PRN: Re-assess suicidal risk via Frequent Screener  5/23/2025 1130 by Angela Hutson RN  Outcome: Progressing     Problem: Psychosis  Goal: Will report no hallucinations or delusions  Description: INTERVENTIONS:1. Administer medication as  ordered2. Assist with reality testing to support increasing orientation3. Assess if patient's hallucinations or delusions are encouraging self harm or harm to others and intervene as appropriate  5/23/2025 1130 by Angela Hutson RN  Outcome: Not Progressing     Problem: Psychosis  Goal: Will report no hallucinations or delusions  Description: INTERVENTIONS:1. Administer medication as  ordered2. Assist with reality testing to support increasing orientation3. Assess if patient's hallucinations or delusions are encouraging self harm or harm to others and intervene as appropriate  5/23/2025 1130 by Angela Hutson RN  Outcome: Not Progressing   Pt. Is visible in the milieu. She is quiet, pleasant and compliant. She is free from falls, self harm or harming others.

## 2025-05-24 NOTE — BH NOTE
patient appeared to be in a pleasant mood, was cooperative, and engaged in appropriate social interactions with both staff and peers.  Patient slept for 7 hours

## 2025-05-25 PROCEDURE — 1240000000 HC EMOTIONAL WELLNESS R&B

## 2025-05-25 PROCEDURE — 6370000000 HC RX 637 (ALT 250 FOR IP): Performed by: PSYCHIATRY & NEUROLOGY

## 2025-05-25 RX ORDER — HALOPERIDOL 5 MG/1
5 TABLET ORAL NIGHTLY
Status: DISCONTINUED | OUTPATIENT
Start: 2025-05-25 | End: 2025-05-27 | Stop reason: HOSPADM

## 2025-05-25 RX ADMIN — HALOPERIDOL 5 MG: 5 TABLET ORAL at 09:35

## 2025-05-25 RX ADMIN — HALOPERIDOL 5 MG: 5 TABLET ORAL at 20:15

## 2025-05-25 ASSESSMENT — PAIN SCALES - GENERAL
PAINLEVEL_OUTOF10: 0
PAINLEVEL_OUTOF10: 0

## 2025-05-25 NOTE — PLAN OF CARE
Problem: Self Harm/Suicidality  Goal: Will have no self-injury during hospital stay  Description: INTERVENTIONS:1.  Ensure constant observer at bedside with Q15M safety checks2.  Maintain a safe environment3.  Secure patient belongings4.  Ensure family/visitors adhere to safety recommendations5.  Ensure safety tray has been added to patient's diet order6.  Every shift and PRN: Re-assess suicidal risk via Frequent Screener  5/24/2025 2045 by Cynthia Tatum, RN  Outcome: Progressing     Problem: Behavior  Goal: Pt/Family maintain appropriate behavior and adhere to behavioral management agreement, if implemented  Description: INTERVENTIONS:1. Assess patient/family's coping skills and  non-compliant behavior (including use of illegal substances)2. Notify security of behavior or suspected illegal substances which indicate the need for search of the family and/or belongings3. Encourage verbalization of thoughts and concerns in a socially appropriate manner4. Utilize positive, consistent limit setting strategies supporting safety of patient, staff and others5. Encourage participation in the decision making process about the behavioral management agreement6. If a visitor's behavior poses a threat to safety call refer to organization policy.7. Initiate consult with , Psychosocial CNS, Spiritual Care as appropriate  5/24/2025 2045 by Cynthia Tatum, RN  Outcome: Progressing     Problem: Anxiety  Goal: Will report anxiety at manageable levels  Description: INTERVENTIONS:1. Administer medication as ordered2. Teach and rehearse alternative coping skills3. Provide emotional support with 1:1 interaction with staff  5/24/2025 2045 by Cynthia Tatum, RN  Outcome: Progressing

## 2025-05-25 NOTE — PLAN OF CARE
Problem: Pain  Goal: Verbalizes/displays adequate comfort level or baseline comfort level  Outcome: Progressing     Problem: Risk for Elopement  Goal: Patient will not exit the unit/facility without proper excort  Outcome: Progressing     Problem: Self Harm/Suicidality  Goal: Will have no self-injury during hospital stay  5/25/2025 0932 by Jessie Luther RN  Outcome: Progressing  5/24/2025 2045 by Cynthia Tatum RN  Outcome: Progressing     Problem: Darlene  Goal: Will exhibit normal sleep and speech and no impulsivity  Outcome: Progressing     Problem: Psychosis  Goal: Will report no hallucinations or delusions  Outcome: Progressing     Problem: Behavior  Goal: Pt/Family maintain appropriate behavior and adhere to behavioral management agreement, if implemented  5/25/2025 0932 by Jessie Luther RN  Outcome: Progressing  5/24/2025 2045 by Cynthia Tatum RN  Outcome: Progressing     Problem: Anxiety  Goal: Will report anxiety at manageable levels  5/25/2025 0932 by Jessie Luther RN  Outcome: Progressing  5/24/2025 2045 by Cynthia Tatum RN  Outcome: Progressing     Problem: Involuntary Admit  Goal: Will cooperate with staff recommendations and doctor's orders and will demonstrate appropriate behavior  Outcome: Progressing     The patient has been quiet in bed most of the morning.  Presently she is sleeping.  Her appetite was good for breakfast this morning.   Her behavior has been appropriate.   She contracted for safety and denies thoughts of harming others.   Will continue to provide a safe and supportive environment.   Patient is utilizing non skid socks.  Patient has been fall free.

## 2025-05-25 NOTE — PROGRESS NOTES
Behavioral Health Progress Note    Admit Date: 5/21/2025  Hospital day : 4    Vitals : Patient Vitals for the past 8 hrs:   BP Temp Temp src Pulse Resp SpO2   05/25/25 0849 107/74 98.6 °F (37 °C) Oral 100 18 99 %     Labs:  No results found for this or any previous visit (from the past 24 hours).  Meds:   Current Facility-Administered Medications   Medication Dose Route Frequency    haloperidol (HALDOL) tablet 5 mg  5 mg Oral BID    benztropine (COGENTIN) tablet 1 mg  1 mg Oral Q6H PRN    acetaminophen (TYLENOL) tablet 650 mg  650 mg Oral Q4H PRN    aluminum & magnesium hydroxide-simethicone (MAALOX PLUS) 200-200-20 MG/5ML suspension 30 mL  30 mL Oral Q6H PRN    hydrOXYzine HCl (ATARAX) tablet 25 mg  25 mg Oral Q6H PRN    haloperidol (HALDOL) tablet 5 mg  5 mg Oral Q6H PRN    Or    haloperidol lactate (HALDOL) injection 5 mg  5 mg IntraMUSCular Q6H PRN    benztropine mesylate (COGENTIN) injection 1 mg  1 mg IntraMUSCular Q6H PRN    traZODone (DESYREL) tablet 50 mg  50 mg Oral Nightly PRN    benzocaine-menthol (CEPACOL SORE THROAT) lozenge 1 lozenge  1 lozenge Oral Q2H PRN      Hospital Problems: Principal Problem:    Delusional disorder (HCC)  Resolved Problems:    * No resolved hospital problems. *      Subjective:   Medication side effects: none      Mental Status Exam  Sensorium: alert  Orientation: only aware of time, place, and person  Relations: guarded, passive, and uncooperative  Eye Contact: poor  Appearance: is unkempt  Thought Process: slow rate of thoughts, poor abstract reasoning/computation, and simple logical    Thought Content: denial of illness, oppositional but now taking meds   Suicidal: denies   Homicidal: denies   Mood: is irritable and unhappy   Affect:  strange, constricted  Memory: is impaired and is recent     Concentration: distractable  Abstraction: concrete  Insight: The patient shows little insight    OR Poor  Judgement: is psychologically impaired OR  Poor    Assessment/Plan:   Mild

## 2025-05-25 NOTE — PLAN OF CARE
Problem: Self Harm/Suicidality  Goal: Will have no self-injury during hospital stay  Description: INTERVENTIONS:1.  Ensure constant observer at bedside with Q15M safety checks2.  Maintain a safe environment3.  Secure patient belongings4.  Ensure family/visitors adhere to safety recommendations5.  Ensure safety tray has been added to patient's diet order6.  Every shift and PRN: Re-assess suicidal risk via Frequent Screener  5/25/2025 1958 by Cynthia Tatum, RN  Outcome: Progressing     Problem: Psychosis  Goal: Will report no hallucinations or delusions  Description: INTERVENTIONS:1. Administer medication as  ordered2. Assist with reality testing to support increasing orientation3. Assess if patient's hallucinations or delusions are encouraging self harm or harm to others and intervene as appropriate  5/25/2025 1958 by Cynthia Tatum, RN  Outcome: Progressing     Problem: Behavior  Goal: Pt/Family maintain appropriate behavior and adhere to behavioral management agreement, if implemented  Description: INTERVENTIONS:1. Assess patient/family's coping skills and  non-compliant behavior (including use of illegal substances)2. Notify security of behavior or suspected illegal substances which indicate the need for search of the family and/or belongings3. Encourage verbalization of thoughts and concerns in a socially appropriate manner4. Utilize positive, consistent limit setting strategies supporting safety of patient, staff and others5. Encourage participation in the decision making process about the behavioral management agreement6. If a visitor's behavior poses a threat to safety call refer to organization policy.7. Initiate consult with , Psychosocial CNS, Spiritual Care as appropriate  5/25/2025 1958 by Cynthia Tatum, RN  Outcome: Progressing     Problem: Anxiety  Goal: Will report anxiety at manageable levels  Description: INTERVENTIONS:1. Administer medication as ordered2. Teach and rehearse

## 2025-05-26 PROCEDURE — 1240000000 HC EMOTIONAL WELLNESS R&B

## 2025-05-26 NOTE — GROUP NOTE
Group Therapy Note    Date: 5/26/2025    Group Start Time: 1000  Group End Time: 1045  Group Topic: Recreational    [facility]    Cj Srinivasan        Group Therapy Note    Attendees: 5/8    Group: Wheel of Emotions   Focus: Patients engaged in group that focused on their emotions. Patients created a picture, an abstract piece of art, or a block of colours showing how each emotion is illustrated in their mind. This group may help patients process emotions, identify triggers, and learn coping mechanisms          Notes:  Patient reported feeling \"good\" during check-in. Patient filled her emotion wheel with the emotions happy, excited, and peace. Patient at times displaying bizarre behavior, pt left group several times to return to her room and flush her toilet several times. Patient have at times had random laughing outbursts. Pt preoccupied with completing a variety of things unrelated to group.      Status After Intervention:  Unchanged    Participation Level: Active Listener and Interactive    Participation Quality: Appropriate and Sharing      Speech:  normal      Thought Process/Content: Logical      Affective Functioning: Congruent      Mood: euthymic      Level of consciousness:  Alert, Attentive, and Preoccupied      Response to Learning: Able to verbalize current knowledge/experience      Endings: None Reported    Modes of Intervention: Socialization, Clarifying, and Activity        Certified Therapeutic Recreation Specialist    Cj Srinivasan

## 2025-05-26 NOTE — BH NOTE
KIKE Note: Staff performed rounds with the on-coming shift and gave shift report prior to turning over to the on coming shift.

## 2025-05-26 NOTE — PLAN OF CARE
Problem: Pain  Goal: Verbalizes/displays adequate comfort level or baseline comfort level  Outcome: Progressing     Problem: Risk for Elopement  Goal: Patient will not exit the unit/facility without proper excort  Outcome: Progressing     Problem: Self Harm/Suicidality  Goal: Will have no self-injury during hospital stay  Description: INTERVENTIONS:1.  Ensure constant observer at bedside with Q15M safety checks2.  Maintain a safe environment3.  Secure patient belongings4.  Ensure family/visitors adhere to safety recommendations5.  Ensure safety tray has been added to patient's diet order6.  Every shift and PRN: Re-assess suicidal risk via Frequent Screener  Outcome: Progressing     Problem: Darlene  Goal: Will exhibit normal sleep and speech and no impulsivity  Description: INTERVENTIONS:1. Administer medication as ordered2. Set limits on impulsive behavior3. Make attempts to decrease external stimuli as possible  Outcome: Progressing     Problem: Psychosis  Goal: Will report no hallucinations or delusions  Description: INTERVENTIONS:1. Administer medication as  ordered2. Assist with reality testing to support increasing orientation3. Assess if patient's hallucinations or delusions are encouraging self harm or harm to others and intervene as appropriate  Outcome: Progressing     Problem: Behavior  Goal: Pt/Family maintain appropriate behavior and adhere to behavioral management agreement, if implemented  Description: INTERVENTIONS:1. Assess patient/family's coping skills and  non-compliant behavior (including use of illegal substances)2. Notify security of behavior or suspected illegal substances which indicate the need for search of the family and/or belongings3. Encourage verbalization of thoughts and concerns in a socially appropriate manner4. Utilize positive, consistent limit setting strategies supporting safety of patient, staff and others5. Encourage participation in the decision making process about the

## 2025-05-26 NOTE — BH NOTE
Patient appears to have slept roughly 7 hours during the shift without any major incidents or events. Staff will continue to monitor for behavior and safety, and report any changes.

## 2025-05-26 NOTE — PROGRESS NOTES
Behavioral Health Progress Note    Admit Date: 5/21/2025  Hospital day : 5    Vitals : Patient Vitals for the past 8 hrs:   BP Temp Temp src Pulse Resp SpO2   05/26/25 0823 116/77 99 °F (37.2 °C) Oral 100 18 100 %     Labs:  No results found for this or any previous visit (from the past 24 hours).  Meds:   Current Facility-Administered Medications   Medication Dose Route Frequency    haloperidol (HALDOL) tablet 5 mg  5 mg Oral Nightly    benztropine (COGENTIN) tablet 1 mg  1 mg Oral Q6H PRN    acetaminophen (TYLENOL) tablet 650 mg  650 mg Oral Q4H PRN    aluminum & magnesium hydroxide-simethicone (MAALOX PLUS) 200-200-20 MG/5ML suspension 30 mL  30 mL Oral Q6H PRN    hydrOXYzine HCl (ATARAX) tablet 25 mg  25 mg Oral Q6H PRN    haloperidol (HALDOL) tablet 5 mg  5 mg Oral Q6H PRN    Or    haloperidol lactate (HALDOL) injection 5 mg  5 mg IntraMUSCular Q6H PRN    benztropine mesylate (COGENTIN) injection 1 mg  1 mg IntraMUSCular Q6H PRN    traZODone (DESYREL) tablet 50 mg  50 mg Oral Nightly PRN    benzocaine-menthol (CEPACOL SORE THROAT) lozenge 1 lozenge  1 lozenge Oral Q2H PRN      Hospital Problems: Principal Problem:    Delusional disorder (HCC)  Resolved Problems:    * No resolved hospital problems. *      Subjective:   Medication side effects: none      Mental Status Exam  Sensorium: alert  Orientation: only aware of time, place, and person  Relations: guarded, passive, and uncooperative  Eye Contact: poor  Appearance: is unkempt  Thought Process: slow rate of thoughts, poor abstract reasoning/computation, and simple logical    Thought Content: denial of illness, Suicidal: denies   Homicidal: denies   Mood: is neutral  Affect:  strange, constricted  Memory: is impaired and is recent     Concentration: distractable  Abstraction: concrete  Insight: The patient shows little insight    OR Poor  Judgement: is psychologically impaired OR  Poor    Assessment/Plan:   Mild improvement    Identifying data: The patient is

## 2025-05-27 VITALS
BODY MASS INDEX: 33.26 KG/M2 | WEIGHT: 165 LBS | OXYGEN SATURATION: 99 % | TEMPERATURE: 98.3 F | RESPIRATION RATE: 18 BRPM | HEART RATE: 92 BPM | DIASTOLIC BLOOD PRESSURE: 56 MMHG | SYSTOLIC BLOOD PRESSURE: 96 MMHG | HEIGHT: 59 IN

## 2025-05-27 RX ORDER — HALOPERIDOL 5 MG/1
5 TABLET ORAL NIGHTLY
Qty: 14 TABLET | Refills: 0 | Status: SHIPPED | OUTPATIENT
Start: 2025-05-27

## 2025-05-27 ASSESSMENT — PAIN SCALES - GENERAL: PAINLEVEL_OUTOF10: 0

## 2025-05-27 NOTE — PROGRESS NOTES
Patient was visible in the community room this morning.She interacted with selected peers.She was anxious to leave.She did say her goodbyes at 1330 prior to leaving and was  escorted off the unit by staff.She voiced no concerns.
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
## 2025-05-27 NOTE — PLAN OF CARE
Problem: Self Harm/Suicidality  Goal: Will have no self-injury during hospital stay  Description: INTERVENTIONS:1.  Ensure constant observer at bedside with Q15M safety checks2.  Maintain a safe environment3.  Secure patient belongings4.  Ensure family/visitors adhere to safety recommendations5.  Ensure safety tray has been added to patient's diet order6.  Every shift and PRN: Re-assess suicidal risk via Frequent Screener  5/27/2025 0325 by Deshaun Gaspar RN  Outcome: Progressing  5/26/2025 1740 by Destiny Orlando RN  Outcome: Progressing     Problem: Behavior  Goal: Pt/Family maintain appropriate behavior and adhere to behavioral management agreement, if implemented  Description: INTERVENTIONS:1. Assess patient/family's coping skills and  non-compliant behavior (including use of illegal substances)2. Notify security of behavior or suspected illegal substances which indicate the need for search of the family and/or belongings3. Encourage verbalization of thoughts and concerns in a socially appropriate manner4. Utilize positive, consistent limit setting strategies supporting safety of patient, staff and others5. Encourage participation in the decision making process about the behavioral management agreement6. If a visitor's behavior poses a threat to safety call refer to organization policy.7. Initiate consult with , Psychosocial CNS, Spiritual Care as appropriate  5/27/2025 0325 by Deshaun Gaspar RN  Outcome: Progressing  5/26/2025 1740 by Destiny Orlando RN  Outcome: Progressing     Problem: Involuntary Admit  Goal: Will cooperate with staff recommendations and doctor's orders and will demonstrate appropriate behavior  Description: INTERVENTIONS:1. Treat underlying conditions and offer medication as ordered2. Educate regarding involuntary admission procedures and rules3. Contain excessive/inappropriate behavior per unit and hospital policies  5/27/2025 0325 by Deshaun Gaspar RN  Outcome:

## 2025-05-27 NOTE — BH NOTE
The patient was being observed in the dayroom watching TV, pacing the hallways, and gazing frequently. The patient appeared to keep to herself unless spoken to. At approximately 2000, the patient participated in snack time in the dayroom, remaining calm. However, she did rearrange two chairs in the corner before sitting back and giggling to herself. Overall, the patient was cooperative. The patient was observed to have slept approximately 5 hours.